# Patient Record
Sex: FEMALE | Race: WHITE | NOT HISPANIC OR LATINO | Employment: FULL TIME | URBAN - METROPOLITAN AREA
[De-identification: names, ages, dates, MRNs, and addresses within clinical notes are randomized per-mention and may not be internally consistent; named-entity substitution may affect disease eponyms.]

---

## 2019-03-01 ENCOUNTER — TRANSFERRED RECORDS (OUTPATIENT)
Dept: MULTI SPECIALTY CLINIC | Facility: CLINIC | Age: 34
End: 2019-03-01

## 2019-03-01 LAB — PAP SMEAR - HIM PATIENT REPORTED: NEGATIVE

## 2021-03-03 ENCOUNTER — OFFICE VISIT (OUTPATIENT)
Dept: FAMILY MEDICINE | Facility: CLINIC | Age: 36
End: 2021-03-03
Payer: COMMERCIAL

## 2021-03-03 VITALS
OXYGEN SATURATION: 98 % | HEIGHT: 70 IN | WEIGHT: 175.4 LBS | BODY MASS INDEX: 25.11 KG/M2 | SYSTOLIC BLOOD PRESSURE: 122 MMHG | TEMPERATURE: 97.1 F | DIASTOLIC BLOOD PRESSURE: 82 MMHG | HEART RATE: 68 BPM

## 2021-03-03 DIAGNOSIS — Z13.220 LIPID SCREENING: ICD-10-CM

## 2021-03-03 DIAGNOSIS — R03.0 ELEVATED BP WITHOUT DIAGNOSIS OF HYPERTENSION: ICD-10-CM

## 2021-03-03 DIAGNOSIS — Z82.49 FAMILY HISTORY OF HYPERTENSION: ICD-10-CM

## 2021-03-03 DIAGNOSIS — Z76.89 ENCOUNTER TO ESTABLISH CARE WITH NEW DOCTOR: Primary | ICD-10-CM

## 2021-03-03 LAB
ANION GAP SERPL CALCULATED.3IONS-SCNC: 3 MMOL/L (ref 3–14)
BUN SERPL-MCNC: 11 MG/DL (ref 7–30)
CALCIUM SERPL-MCNC: 9 MG/DL (ref 8.5–10.1)
CHLORIDE SERPL-SCNC: 108 MMOL/L (ref 94–109)
CHOLEST SERPL-MCNC: 159 MG/DL
CO2 SERPL-SCNC: 28 MMOL/L (ref 20–32)
CREAT SERPL-MCNC: 0.93 MG/DL (ref 0.52–1.04)
GFR SERPL CREATININE-BSD FRML MDRD: 78 ML/MIN/{1.73_M2}
GLUCOSE SERPL-MCNC: 85 MG/DL (ref 70–99)
HDLC SERPL-MCNC: 50 MG/DL
LDLC SERPL CALC-MCNC: 97 MG/DL
NONHDLC SERPL-MCNC: 109 MG/DL
POTASSIUM SERPL-SCNC: 4.3 MMOL/L (ref 3.4–5.3)
SODIUM SERPL-SCNC: 139 MMOL/L (ref 133–144)
TRIGL SERPL-MCNC: 59 MG/DL

## 2021-03-03 PROCEDURE — 99203 OFFICE O/P NEW LOW 30 MIN: CPT | Performed by: FAMILY MEDICINE

## 2021-03-03 PROCEDURE — 36415 COLL VENOUS BLD VENIPUNCTURE: CPT | Performed by: FAMILY MEDICINE

## 2021-03-03 PROCEDURE — 80048 BASIC METABOLIC PNL TOTAL CA: CPT | Performed by: FAMILY MEDICINE

## 2021-03-03 PROCEDURE — 80061 LIPID PANEL: CPT | Performed by: FAMILY MEDICINE

## 2021-03-03 ASSESSMENT — MIFFLIN-ST. JEOR: SCORE: 1559.27

## 2021-03-03 NOTE — LETTER
March 3, 2021      Adriana Bansal  [address]  [place] [address]        Dear ,    We are writing to inform you of your test results.    Your test results fall within the expected range(s) or remain unchanged from previous results.  Please continue with current treatment plan.    Resulted Orders   Basic metabolic panel   Result Value Ref Range    Sodium 139 133 - 144 mmol/L    Potassium 4.3 3.4 - 5.3 mmol/L    Chloride 108 94 - 109 mmol/L    Carbon Dioxide 28 20 - 32 mmol/L    Anion Gap 3 3 - 14 mmol/L    Glucose 85 70 - 99 mg/dL      Comment:      Fasting specimen    Urea Nitrogen 11 7 - 30 mg/dL    Creatinine 0.93 0.52 - 1.04 mg/dL    GFR Estimate 78 >60 mL/min/[1.73_m2]      Comment:      Non  GFR Calc  Starting 12/18/2018, serum creatinine based estimated GFR (eGFR) will be   calculated using the Chronic Kidney Disease Epidemiology Collaboration   (CKD-EPI) equation.      GFR Estimate If Black >90 >60 mL/min/[1.73_m2]      Comment:       GFR Calc  Starting 12/18/2018, serum creatinine based estimated GFR (eGFR) will be   calculated using the Chronic Kidney Disease Epidemiology Collaboration   (CKD-EPI) equation.      Calcium 9.0 8.5 - 10.1 mg/dL   Lipid panel reflex to direct LDL Fasting   Result Value Ref Range    Cholesterol 159 <200 mg/dL    Triglycerides 59 <150 mg/dL      Comment:      Fasting specimen    HDL Cholesterol 50 >49 mg/dL    LDL Cholesterol Calculated 97 <100 mg/dL      Comment:      Desirable:       <100 mg/dl    Non HDL Cholesterol 109 <130 mg/dL       If you have any questions or concerns, please call the clinic at the number listed above.       Sincerely,      Yanci Palmer MD

## 2021-03-03 NOTE — PROGRESS NOTES
"    Assessment & Plan     Encounter to establish care with new doctor    Primarily follows with [facility], but they asked her to have a traditional doc as well.    Fairly healthy, utd on cancer screening and pap (last done ~2 years ago at [facility]).  Sent to abstracting.    Elevated BP without diagnosis of hypertension  Family history of hypertension  Patient Instructions   1. Keep up good work with diet and exercise and stress management!  2. You do not have hypertension at this time, but given your family history you are predisposed to developing it  3. Keep checking your blood pressure once a week to once a month  4. We'll check your kidney function today.    - Basic metabolic panel    Lipid screening    Hasn't had; will check given age, family history, and BP  - Lipid panel reflex to direct LDL Fasting       BMI:   Estimated body mass index is 25.24 kg/m  as calculated from the following:    Height as of this encounter: 1.775 m (5' 9.9\").    Weight as of this encounter: 79.6 kg (175 lb 6.4 oz).   Weight management plan: Discussed healthy diet and exercise guidelines    Return in about 1 year (around 3/3/2022) for Routine preventive, with me, in person.    Yanci Palmer MD  [facility]    Quique Wright is a 35 year old who presents for the following health issues    HPI     New pt   They wanted second opinion  and she has been having periodic tightness in the chest since 2018.   Family history of htn   She is wondering if she should have some testing done   She is attending [facility]     Primary care at [facility] ([facility]  They recommended she get 2nd opinion; had high blood pressure reading in July 142/99.    Also - for a year and a half periodically would have tightness in chest.  Thought was stress.  But when had high blood pressure then worried that whole time might have had high blood pressure.  Had a very stressful job at the " "time.    So started working out, eating healthy, lost 20 lbs.  blood pressure improved.    Has blood pressure log = checks.  [employer].      Concern - was year and a half when had high blood pressure - is that a worry  B. Dad and maternal grandma had high bp  C. Will it come back if changes diet?      Hypertension Follow-up      Do you check your blood pressure regularly outside of the clinic? Yes     Are you following a low salt diet? Yes    Are your blood pressures ever more than 140 on the top number (systolic) OR more   than 90 on the bottom number (diastolic), for example 140/90? Yes      How many servings of fruits and vegetables do you eat daily?  4 or more    On average, how many sweetened beverages do you drink each day (Examples: soda, juice, sweet tea, etc.  Do NOT count diet or artificially sweetened beverages)?   0    How many days per week do you exercise enough to make your heart beat faster? 5    How many minutes a day do you exercise enough to make your heart beat faster? 30 - 60    How many days per week do you miss taking your medication? 0    New Patient/Transfer of Care    Review of Systems   Constitutional, HEENT, cardiovascular, pulmonary, gi and gu systems are negative, except as otherwise noted.      Objective    /82 (BP Location: Left arm, Patient Position: Sitting, Cuff Size: Adult Regular)   Pulse 68   Temp 97.1  F (36.2  C) (Temporal)   Ht 1.775 m (5' 9.9\")   Wt 79.6 kg (175 lb 6.4 oz)   LMP 02/13/2021 (Approximate)   SpO2 98%   BMI 25.24 kg/m    Body mass index is 25.24 kg/m .  Physical Exam   GENERAL: healthy, alert and no distress  MS: no gross musculoskeletal defects noted, no edema  SKIN: no suspicious lesions or rashes  NEURO: Normal strength and tone, mentation intact and speech normal  PSYCH: mentation appears normal, affect normal/bright                    "

## 2021-03-03 NOTE — PATIENT INSTRUCTIONS
1. Keep up good work with diet and exercise and stress management!  2. You do not have hypertension at this time, but given your family history you are predisposed to developing it  3. Keep checking your blood pressure once a week to once a month  4. We'll check your kidney function today.

## 2021-03-15 ENCOUNTER — AMBULATORY - HEALTHEAST (OUTPATIENT)
Dept: NURSING | Facility: CLINIC | Age: 36
End: 2021-03-15

## 2021-04-05 ENCOUNTER — AMBULATORY - HEALTHEAST (OUTPATIENT)
Dept: NURSING | Facility: CLINIC | Age: 36
End: 2021-04-05

## 2023-12-24 ENCOUNTER — OFFICE VISIT (OUTPATIENT)
Dept: URGENT CARE | Facility: URGENT CARE | Age: 38
End: 2023-12-24
Payer: COMMERCIAL

## 2023-12-24 VITALS
TEMPERATURE: 98.6 F | BODY MASS INDEX: 26.48 KG/M2 | HEART RATE: 90 BPM | OXYGEN SATURATION: 98 % | WEIGHT: 184 LBS | SYSTOLIC BLOOD PRESSURE: 122 MMHG | DIASTOLIC BLOOD PRESSURE: 88 MMHG

## 2023-12-24 DIAGNOSIS — R07.0 THROAT PAIN: Primary | ICD-10-CM

## 2023-12-24 LAB
DEPRECATED S PYO AG THROAT QL EIA: NEGATIVE
FLUAV AG SPEC QL IA: NEGATIVE
FLUBV AG SPEC QL IA: NEGATIVE
GROUP A STREP BY PCR: NOT DETECTED

## 2023-12-24 PROCEDURE — 99213 OFFICE O/P EST LOW 20 MIN: CPT

## 2023-12-24 PROCEDURE — 87804 INFLUENZA ASSAY W/OPTIC: CPT

## 2023-12-24 PROCEDURE — 87651 STREP A DNA AMP PROBE: CPT

## 2023-12-24 NOTE — PROGRESS NOTES
Assessment & Plan     Throat pain  Patient history and exam suggestive of upper respiratory infection due to viral etiology.  Strep and Influenza swabs collected. Rapid Strep and Influenza negative, PCR Strep pending. Discussed the use of OTC medications such as flonase, mucinex, tylenol/ibuprofen, and honey for symptomatic treatment. Push fluids, adequate rest. Follow up by end of week if not improving. Patient fully understands and is agreeable with plan of care, at this point patient will follow up as needed unless acute concerns arise in the meantime.  - Streptococcus A Rapid Screen w/Reflex to PCR - Clinic Collect  - Influenza A & B Antigen - Clinic Collect  - Group A Streptococcus PCR Throat Swab        No follow-ups on file.    [facility]al[facility] CATHY Wright is a 38 year old female who presents to clinic today for the following health issues:  Chief Complaint   Patient presents with    Urgent Care    Cough     Wet chest Cough started today, cough, sore throat, fever x5days. Tested negative for covid 2 times. Pt had positive covid in October.     HPI    URI Adult    Onset of symptoms was 5 day(s) ago.  Course of illness is same.    Severity moderate  Current and Associated symptoms: fever, chills, runny nose, cough - non-productive, sore throat, hoarse voice, facial pain/pressure, body aches, and fatigue  Treatment measures tried include Tylenol/Ibuprofen, OTC Cough med, Fluids, Rest, and tea, salt water garggles,  hot showers.  Predisposing factors include None.    Concerned given has been around brother     Review of Systems  Constitutional, HEENT, cardiovascular, pulmonary, gi and gu systems are negative, except as otherwise noted.      Objective    /88   Pulse 90   Temp 98.6  F (37  C) (Tympanic)   Wt 83.5 kg (184 lb)   SpO2 98%   BMI 26.48 kg/m    Physical Exam  Vitals and nursing note reviewed.   Constitutional:       General: She is not  in acute distress.     Appearance: Normal appearance. She is not ill-appearing.   HENT:      Right Ear: Tympanic membrane, ear canal and external ear normal. There is no impacted cerumen.      Left Ear: Tympanic membrane, ear canal and external ear normal. There is no impacted cerumen.      Nose: Congestion and rhinorrhea present.      Mouth/Throat:      Mouth: Mucous membranes are moist.      Pharynx: Posterior oropharyngeal erythema present. No oropharyngeal exudate.   Eyes:      General:         Right eye: No discharge.         Left eye: No discharge.      Conjunctiva/sclera: Conjunctivae normal.      Pupils: Pupils are equal, round, and reactive to light.   Cardiovascular:      Rate and Rhythm: Normal rate and regular rhythm.      Heart sounds: No murmur heard.     No friction rub. No gallop.   Pulmonary:      Effort: No respiratory distress.      Breath sounds: Normal breath sounds. No stridor. No wheezing, rhonchi or rales.   Musculoskeletal:      Cervical back: No tenderness.   Lymphadenopathy:      Cervical: Cervical adenopathy present.   Skin:     General: Skin is warm and dry.   Neurological:      Mental Status: She is alert.   Psychiatric:         Mood and Affect: Mood normal.         Behavior: Behavior normal.         Thought Content: Thought content normal.         Judgment: Judgment normal.          Results for orders placed or performed in visit on 12/24/23 (from the past 24 hour(s))   Streptococcus A Rapid Screen w/Reflex to PCR - Clinic Collect    Specimen: Throat; Swab   Result Value Ref Range    Group A Strep antigen Negative Negative   Influenza A & B Antigen - Clinic Collect    Specimen: Nose; Swab   Result Value Ref Range    Influenza A antigen Negative Negative    Influenza B antigen Negative Negative    Narrative    Test results must be correlated with clinical data. If necessary, results should be confirmed by a molecular assay or viral culture.

## 2023-12-24 NOTE — PATIENT INSTRUCTIONS
OTC options  1. Mucinex (Guaifennesin)     2.  Robitussin    3.  Delsym    4.  Salt water Gargles for sore throat    5.  Tylenol or Ibuprofen    6.  Warm tea with Honey    7.  Flonase for sinus congestion      Come back in if noting symptoms are not improving or worsening by end of week.

## 2023-12-29 ENCOUNTER — OFFICE VISIT (OUTPATIENT)
Dept: URGENT CARE | Facility: URGENT CARE | Age: 38
End: 2023-12-29
Payer: COMMERCIAL

## 2023-12-29 VITALS
OXYGEN SATURATION: 100 % | HEART RATE: 60 BPM | TEMPERATURE: 98.2 F | DIASTOLIC BLOOD PRESSURE: 91 MMHG | WEIGHT: 184 LBS | BODY MASS INDEX: 27.89 KG/M2 | HEIGHT: 68 IN | SYSTOLIC BLOOD PRESSURE: 145 MMHG

## 2023-12-29 DIAGNOSIS — J32.9 SINOBRONCHITIS: Primary | ICD-10-CM

## 2023-12-29 DIAGNOSIS — J40 SINOBRONCHITIS: Primary | ICD-10-CM

## 2023-12-29 PROCEDURE — 99213 OFFICE O/P EST LOW 20 MIN: CPT | Performed by: FAMILY MEDICINE

## 2023-12-29 RX ORDER — AZITHROMYCIN 250 MG/1
TABLET, FILM COATED ORAL
Qty: 6 TABLET | Refills: 0 | Status: SHIPPED | OUTPATIENT
Start: 2023-12-29 | End: 2024-01-03

## 2023-12-29 NOTE — PROGRESS NOTES
Subjective: See notes from 5 days ago.  She is now on day 10 and she has had her ups and downs in thinks maybe it is a little better but she continues to have a sore throat at night, coughing and congestion, history of sinus infections and she is worried that she may be heading that direction.  She actually is getting less mucus out.  No fevers.    Objective: ENT is normal.  Neck is normal.  Lungs are clear.    Assessment and plan: Sinobronchitis may be but at this point I do not think it is definitively bacterial.  I wrote out a prescription to take for Zithromax if it takes a turn for the worse but if it slowly gets better she should just wait it out.